# Patient Record
Sex: MALE | Race: WHITE | NOT HISPANIC OR LATINO | URBAN - METROPOLITAN AREA
[De-identification: names, ages, dates, MRNs, and addresses within clinical notes are randomized per-mention and may not be internally consistent; named-entity substitution may affect disease eponyms.]

---

## 2024-03-12 ENCOUNTER — TELEPHONE (OUTPATIENT)
Dept: GENETICS | Facility: HOSPITAL | Age: 54
End: 2024-03-12
Payer: COMMERCIAL

## 2024-03-12 NOTE — TELEPHONE ENCOUNTER
Pt returned my call. Pt has a list of family hx and plans to email me his family hx questionnaire and the familial hx list his sister gave him.

## 2024-03-12 NOTE — TELEPHONE ENCOUNTER
Referring provider's office provided pt's wife number after the number on file said it was disconnected. Reminded of pt's appt tomorrow by phone. Asked her to ask pt to return my call to review family hx prior to his appt tomorrow.

## 2024-03-12 NOTE — TELEPHONE ENCOUNTER
Called pt to remind him of his upcoming genetic counseling appt tomorrow. Number says disconnected/ not in service.

## 2024-03-13 ENCOUNTER — CLINICAL SUPPORT (OUTPATIENT)
Dept: GENETICS | Facility: HOSPITAL | Age: 54
End: 2024-03-13
Payer: COMMERCIAL

## 2024-03-13 DIAGNOSIS — Z80.1 FAMILY HISTORY OF LUNG CANCER: ICD-10-CM

## 2024-03-13 DIAGNOSIS — Z13.79 GENETIC TESTING: Primary | ICD-10-CM

## 2024-03-13 DIAGNOSIS — Z80.3 FAMILY HISTORY OF BREAST CANCER: ICD-10-CM

## 2024-03-13 DIAGNOSIS — Z80.41 FAMILY HISTORY OF OVARIAN CANCER: ICD-10-CM

## 2024-03-13 DIAGNOSIS — Z80.0 FAMILY HISTORY OF PANCREATIC CANCER: ICD-10-CM

## 2024-03-13 NOTE — PROGRESS NOTES
Boris Cassidy, a 53 y.o. male, was referred for genetic counseling due to a family history of pancreatic cancer. Genetic counseling was performed via telephone. Mr. Cassidy confirmed his full name, date of birth, and that he was physically located in the [facility] at the time of the appointment. Mr. Cassidy has no personal history of cancer. He reports 10 years ago he had a high white blood cell count and enlarged liver and spleen. He was sent for a work-up at the [facility] and reports he had a normal white blood cell count after. He has colonoscopies every 10 years and reports 1 small benign polyp. he was interested in discussing his risk for a hereditary cancer syndrome. Mr. Cassidy decided to pursue comprehensive genetic testing to evaluate his risk of cancer, therefore the Multi-Cancer Panel was ordered through [employer] which analyzes 70 genes associated with an increased cancer risk. A buccal kit will be sent to his home for sample collection. Results are expected 2-3 weeks after the lab receives his sample.     PERTINENT FAMILY HISTORY: (See attached pedigree)   Brother:     Bone marrow cancer, passed age 38, chemical exposure  Mother:    Breast cancer, early 50s  Mat. Great-Grandmother:   Stomach cancer  Father:     Lung cancer, 40s, lobectomy  Pancreatic cancer, 83  Pat. Uncle:     Lung cancer  Pat. Great-Aunt 1:    Ovarian cancer  Pat. Great-Aunt 2:    Pancreatic cancer  Pat. Great-Aunt 3:    Esophageal cancer    We do not have medical records regarding any of these diagnoses.     RISK ASSESSMENT:  Mr. Cassidy's family history of pancreatic cancer raises the question of a hereditary cancer syndrome. NCCN guidelines for genetic testing for pancreatic cancer suspectibility genes (TRISTAN, BRCA1, BRCA2, CDKN2A, and Worthy syndrome genes) states that individuals with a first-degree relative diagnosed with pancreatic cancer at any age may consider genetic testing. Based on Mr. Cassidy's father's  diagnosis of pancreatic cancer, he would clearly meet this criteria. This risk assessment is based on the family history information provided at the time of the appointment.  The assessment could change in the future should new information be obtained.    GENETIC COUNSELING (30 minutes):  We reviewed the family history information in detail. Cases of cancer follow three general patterns: sporadic, familial, and hereditary.  While most cancer is sporadic, some cases appear to occur in family clusters.  These cases are said to be familial and account for 10-20% of cancer cases.  Familial cases may be due to a combination of shared genes and environmental factors among family members.  In even fewer cases, the risk for cancer is inherited, and the genes responsible for the increased cancer risk are known.       Family histories typical of hereditary cancer syndromes usually include multiple first- and second-degree relatives diagnosed with cancer types that define a syndrome.  These cases tend to be diagnosed at younger-than-expected ages and can be bilateral or multifocal.  The cancer in these families follows an autosomal dominant inheritance pattern, which indicates the likely presence of a mutation in a cancer susceptibility gene.  Children and siblings of an individual believed to carry this mutation have a 50% chance of inheriting that mutation, thereby inheriting the increased risk to develop cancer.  These mutations can be passed down from the maternal or the paternal lineage.     Hereditary pancreatic cancer accounts for approximately 10% of all cases of pancreatic cancer. The gene most often associated with a hereditary risk for pancreatic cancer is BRCA2. Mutations in BRCA2 confer up to a 7% risk for pancreatic cancer. Mutations in BRCA1 and BRCA2 also confer an increased risk for breast cancer, ovarian cancer, male breast cancer, and prostate cancer. Women with a BRCA1 or BRCA2 mutation have up to an 87%  risk of breast cancer and up to a 44% risk of ovarian cancer. Worthy syndrome is a hereditary colon cancer syndrome that is associated with pancreatic cancer.  We reviewed that, in some cases, the identification of a genetic mutation may impact treatment options for some types of cancer.    There are other genes that are known to be associated with an increased risk for cancer.  Some of these genes have well defined cancer risks and established management guidelines.  Other genes that can be tested for have been more recently described, and there may be less data regarding the risks and therefore may not have established management guidelines. We discussed these limitations at length.  Based on Mr. Cassidy's desire to get as much information as possible regarding his personal risks and potential risks for his family, he opted to pursue testing through a panel that would look at several other genes known to increase the risk for cancer.     GENETIC TESTING:  The risks, benefits, and limitations of genetic testing and implications for clinical management following testing were reviewed. DNA test results can influence decisions regarding screening and prevention. Genetic testing can have significant psychological implications for both individuals and families. Also discussed was the possibility of employment and insurance discrimination based on genetic test results and the laws in place to prevent this, as well as the limitations of these laws.       We discussed panel testing, which would involve testing 70 genes associated with increased cancer risk. The implications of a positive or negative test result were discussed.  We also discussed the importance of testing an affected relative and how a negative result for Mr. Cassidy wouldn't necessarily mean the cancers presenting in his family weren't due to a genetic mutation that Mr. Cassidy did not inherit.  In general, a negative genetic test result is most informative if  a mutation has first been established in an affected member of the family.  In cases where an affected individual is not available or interested in testing, it is appropriate to offer testing to an unaffected individual.     We discussed the possibility that, in some cases, genetic test results may be ambiguous due to the identification of a genetic variant of uncertain significance (VUS). These variants may or may not be associated with an increased cancer risk. With multigene panel testing, it is not uncommon for a VUS to be identified.  If a VUS is identified, testing family members is typically not recommended and screening recommendations are made based on the family history.  The laboratories that perform genetic testing work to reclassify the VUS and send out an amended report if and when a VUS is reclassified.  The majority of variant findings are ultimately reclassified to a negative result. Given Mr. Cassidy's family history, a negative test result does not eliminate all cancer risk, although the risk may not be as high as it would with positive genetic testing.     PLAN: Genetic testing was ordered via the Multi-Cancer Panel through [facility]. A buccal kit will be sent to his home for sample collection. Results are expected 2-3 weeks after the lab receives his sample. If he has any questions in the meantime, he is welcome to call me at [phone].      Tootie López MS, [facility], [facility]  Licensed Certified Genetic Counselor

## 2024-04-01 ENCOUNTER — TELEPHONE (OUTPATIENT)
Dept: GENETICS | Facility: HOSPITAL | Age: 54
End: 2024-04-01
Payer: COMMERCIAL

## 2024-04-02 ENCOUNTER — DOCUMENTATION (OUTPATIENT)
Dept: GENETICS | Facility: HOSPITAL | Age: 54
End: 2024-04-02
Payer: COMMERCIAL

## 2024-04-02 NOTE — TELEPHONE ENCOUNTER
Spoke with patient and disclosed negative genetic results. Informed patient these results would be on [facility] and sent to his Dr. Patient denied needing a copy be mailed to him.

## 2024-04-02 NOTE — PROGRESS NOTES
Boris Cassidy, a 53 y.o. male, was referred for genetic counseling due to a family history of pancreatic cancer. Genetic counseling was performed via telephone. Mr. Cassidy confirmed his full name, date of birth, and that he was physically located in the [facility] at the time of the appointment. Mr. Cassidy has no personal history of cancer. He reports 10 years ago he had a high white blood cell count and enlarged liver and spleen. He was sent for a work-up at the [facility] and reports he had a normal white blood cell count after. He has colonoscopies every 10 years and reports 1 small benign polyp. he was interested in discussing his risk for a hereditary cancer syndrome. Mr. Cassidy decided to pursue comprehensive genetic testing to evaluate his risk of cancer, therefore the Multi-Cancer Panel was ordered through [employer] which analyzes 70 genes associated with an increased cancer risk. Genetic testing was negative for known pathogenic mutations in 70 genes on this panel. These normal results were discussed with Mr. Cassidy via telephone on 4/2/2024.     PERTINENT FAMILY HISTORY: (See attached pedigree)   Brother:                                               Bone marrow cancer, passed age 38, chemical exposure  Mother:                                                Breast cancer, early 50s  Mat. Great-Grandmother:                   Stomach cancer  Father:                                                 Lung cancer, 40s, lobectomy  Pancreatic cancer, 83  Pat. Uncle:                                          Lung cancer  Pat. Great-Aunt 1:                               Ovarian cancer  Pat. Great-Aunt 2:                               Pancreatic cancer  Pat. Great-Aunt 3:                               Esophageal cancer     We do not have medical records regarding any of these diagnoses.      RISK ASSESSMENT:  Mr. Cassidy's family history of pancreatic cancer raises the question of a hereditary cancer  syndrome. NCCN guidelines for genetic testing for pancreatic cancer suspectibility genes (TRISTAN, BRCA1, BRCA2, CDKN2A, and Worthy syndrome genes) states that individuals with a first-degree relative diagnosed with pancreatic cancer at any age may consider genetic testing. Based on Mr. Cassidy's father's diagnosis of pancreatic cancer, he would clearly meet this criteria. This risk assessment is based on the family history information provided at the time of the appointment.  The assessment could change in the future should new information be obtained.     GENETIC COUNSELING (30 minutes):  We reviewed the family history information in detail. Cases of cancer follow three general patterns: sporadic, familial, and hereditary.  While most cancer is sporadic, some cases appear to occur in family clusters.  These cases are said to be familial and account for 10-20% of cancer cases.  Familial cases may be due to a combination of shared genes and environmental factors among family members.  In even fewer cases, the risk for cancer is inherited, and the genes responsible for the increased cancer risk are known.       Family histories typical of hereditary cancer syndromes usually include multiple first- and second-degree relatives diagnosed with cancer types that define a syndrome.  These cases tend to be diagnosed at younger-than-expected ages and can be bilateral or multifocal.  The cancer in these families follows an autosomal dominant inheritance pattern, which indicates the likely presence of a mutation in a cancer susceptibility gene.  Children and siblings of an individual believed to carry this mutation have a 50% chance of inheriting that mutation, thereby inheriting the increased risk to develop cancer.  These mutations can be passed down from the maternal or the paternal lineage.     Hereditary pancreatic cancer accounts for approximately 10% of all cases of pancreatic cancer. The gene most often associated with a  hereditary risk for pancreatic cancer is BRCA2. Mutations in BRCA2 confer up to a 7% risk for pancreatic cancer. Mutations in BRCA1 and BRCA2 also confer an increased risk for breast cancer, ovarian cancer, male breast cancer, and prostate cancer. Women with a BRCA1 or BRCA2 mutation have up to an 87% risk of breast cancer and up to a 44% risk of ovarian cancer. Worthy syndrome is a hereditary colon cancer syndrome that is associated with pancreatic cancer.  We reviewed that, in some cases, the identification of a genetic mutation may impact treatment options for some types of cancer.     There are other genes that are known to be associated with an increased risk for cancer.  Some of these genes have well defined cancer risks and established management guidelines.  Other genes that can be tested for have been more recently described, and there may be less data regarding the risks and therefore may not have established management guidelines. We discussed these limitations at length.  Based on Mr. Cassidy's desire to get as much information as possible regarding his personal risks and potential risks for his family, he opted to pursue testing through a panel that would look at several other genes known to increase the risk for cancer.     GENETIC TESTING:  The risks, benefits, and limitations of genetic testing and implications for clinical management following testing were reviewed. DNA test results can influence decisions regarding screening and prevention. Genetic testing can have significant psychological implications for both individuals and families. Also discussed was the possibility of employment and insurance discrimination based on genetic test results and the laws in place to prevent this, as well as the limitations of these laws.       We discussed panel testing, which would involve testing 70 genes associated with increased cancer risk. The implications of a positive or negative test result were discussed.   We also discussed the importance of testing an affected relative and how a negative result for Mr. Cassidy wouldn't necessarily mean the cancers presenting in his family weren't due to a genetic mutation that Mr. Cassidy did not inherit.  In general, a negative genetic test result is most informative if a mutation has first been established in an affected member of the family.  In cases where an affected individual is not available or interested in testing, it is appropriate to offer testing to an unaffected individual.      We discussed the possibility that, in some cases, genetic test results may be ambiguous due to the identification of a genetic variant of uncertain significance (VUS). These variants may or may not be associated with an increased cancer risk. With multigene panel testing, it is not uncommon for a VUS to be identified.  If a VUS is identified, testing family members is typically not recommended and screening recommendations are made based on the family history.  The laboratories that perform genetic testing work to reclassify the VUS and send out an amended report if and when a VUS is reclassified.  The majority of variant findings are ultimately reclassified to a negative result. Given Mr. Cassidy's family history, a negative test result does not eliminate all cancer risk, although the risk may not be as high as it would with positive genetic testing.      TEST RESULTS:  Genetic testing was negative for known pathogenic mutations by sequencing and rearrangement testing for the 70 genes included on the Multi-Cancer panel.  This negative result greatly lowers but does not eliminate the risk of a hereditary cancer syndrome for Mr. Cassidy. This assessment is based on the information provided at the time of consultation and could change should new information be obtained regarding his personal and/or family history.     PLAN:  Genetic counseling remains available to Mr. Cassidy and his family.    Ange is welcome to contact us with any questions in the future at [phone].     Tootie López, MS, [facility], [facility]  Licensed Certified Genetic Counselor    Cc: Boris Starr, Ki William, DO